# Patient Record
(demographics unavailable — no encounter records)

---

## 2025-07-01 NOTE — DISCUSSION/SUMMARY
[FreeTextEntry1] : REASON FOR CONSULT Heather Dowd is a 62-year-old female who was referred by Dr. Ashely Carson for cancer genetic counseling and risk assessment due to a recent breast cancer diagnosis. She was accompanied by two of her daughters.  RELEVANT MEDICAL HISTORY Ms. Dowd was recently diagnosed with right breast cancer in 2025 at 62 years old. Pathology report revealed multifocal infiltrating ductal carcinoma and ductal carcinoma in situ, biomarkers are pending. Upcoming imaging and genetic testing will help to determine treatment plans.   Of note, Ms. Dowd reports undergoing a pancreas biopsy on 2025 due to a history of left-sided pain starting approximately 1 year ago. She stated that results are pending. She reports that her recent colonoscopy last year also revealed results related to her pancreas. Report was not available for review at today's appointment.   OTHER MEDICAL AND SURGICAL HISTORY:  -	 Medical History: hypertension, hyperlipidemia -	Surgical History:  section x3; Patient reports undergoing breast surgery approximately 15 years for a benign breast and milk duct lesion.    PAST OB/GYN HISTORY:  Obstetrical History:  Age at Menarche: 12 Menopausal with LMP at age 55   Age at First Live Birth: 35 Oral Contraceptive Use: No Hormone Replacement Therapy: No   CANCER SCREENING HISTORY:    Breast:  -	Mammography: 2025-Bilateral, an asymmetry was identified in the right breast. Additional imaging was recommended. 2025-Right, a focal asymmetry was identified. Ultrasound-guided biopsies were recommended.  -	Sonography: 2025-Right, an irregular hypoechoic mass corresponding to the mammographic finding was identified as well as an irregular hypoechoic mass. Ultrasound-guided biopsies were recommended.  -	MRI: To be scheduled.  -	Biopsies: 2025-Right, IDC, DCIS GYN: -	Pelvic Examination: last , reportedly normal  -	Sonography: last 3-4 years ago for a routine work-up, reportedly normal  -	CA-125: No Colon: -	Colonoscopy: last , patient reported two colorectal polyps were identified as well as a result related to her pancreas. Next colonoscopy was recommended in 4 years. This was the patient's first and only colonoscopy. -	Upper Endoscopy: last 2025, patient reports that she underwent a pancreatic biopsy and results are pending.  Skin:   -	FBSE: last , reportedly normal  -	Lesions biopsied/removed: No   SOCIAL HISTORY: -	Tobacco-product use: No  FAMILY HISTORY: Maternal ancestry was reported as [place] and paternal ancestry was reported as [place] and [place]. Ashkenazi Gnosticism ancestry and consanguinity were denied. A detailed family history of cancer was ascertained. Relevant diagnoses are detailed below and in the scanned pedigree.   To Ms. Dowd's knowledge, no one in the family has had germline testing for cancer susceptibility.   	 	RISK ASSESSMENT: Ms. Dowd's personal history of breast cancer and/or family history of breast cancer is suggestive of an inherited predisposition to breast cancer and related cancers. Given that she plans to make surgical decisions based on results from genetic testing, we recommended the [facility] BRCAplus STAT Panel testing for genes associated with breast cancer (typically results within 5-12 days). This test analyzes 13 genes: ALLI, BARD1, BRCA1, BRCA2, CDH1, CHEK2, NF1, PALB2, PTEN, RAD51C, RAD51D, STK11, and TP53.  Concurrent reflex genetic testing for genes associated with breast and gynecological cancer was ordered. This test analyzes 19 genes: ALLI, BARD1, BRCA1, BRCA2, BRIP1, CDH1, CHEK2, EPCAM, MLH1, MSH2, MSH6, NF1, PALB2, PMS2, PTEN, RAD51C, RAD51D, STK11, and TP53.  We discussed the risks, benefits and limitations, and implications of genetic testing. We also discussed the psychosocial implications of genetic testing. Possible test results were reviewed with Ms. Dowd, along with associated medical management options.   Ms. Dowd consented to the above-mentioned genetic testing panel. Blood was drawn in our laboratory and sent to [facility] today.  PLAN:  1.	Blood drawn today will be sent to [facility] for analysis.  2.	We will contact Ms. Dowd once the results are available and will schedule a follow-up appointment, as needed. STAT results generally return in 10-12 days from the day the sample is received in the lab.  For any additional questions please call [facility] at [phone].    Krissy Stevenson MS, [facility] Counselor, [facility]   CC:  Dr. Ashely Carson

## 2025-07-01 NOTE — HISTORY OF PRESENT ILLNESS
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 The patient underwent screening mammogram on 6/13/25. Prior to this, her last breast imaging was completed about 3 years ago as the patient was living in [facility]. The mammogram identified an area of asymmetry in the right breast at 6:00. Additional diagnostic views were obtained which identified 2 suspicious masses in the right breast. One at 7:00 7cmFN (measuring 1.5 cm) and 8:00 7cmFN (measuring 0.5 cm), for which biopsy was recommended. Right breast 7:00 biopsy yielded invasive ductal carcinoma (receptors pending); the mass at 8:00 yielded DCIS, with notation that the 2 clips are 2 cm apart on mammogram.  The patient denies any palpable abnormalities of the breast, no skin changes/dimpling, no nipple discharge bilaterally. The patient has a family history of breast cancer in her mother diagnosed in her 50's. No genetic testing has been completed. Due to the family history and new diagnosis, I recommended genetic counseling and testing be done today. The patient is amenable. I discussed the diagnosis with the patient and her daughters at length. I recommended bilateral breast MRI to further evaluate the extent of disease, as well as the contralateral breast and bilateral axilla. We discussed surgical options of breast conservation vs. mastectomy. The patient is unsure at this time, but she did express interest in reconstructive options for both procedures.   Of note, the patient had experienced intestinal pain and underwent imaging of the abdomen, 3 pancreatic cysts were seen, the patient is s/p pancreas biopsy on 6/16/25 at [facility], pathology is pending.

## 2025-07-01 NOTE — DATA REVIEWED
[FreeTextEntry1] : 6/13/25 (R) b/l screening mammo: scattered areas of fibroglandular density. Right 6:00 position area of asymmetry. Spot compression views and targeted ultrasound is recommended. BI-RADS Category 0  6/23/25 (R) right diagnostic mammo and right US: scattered fbg density. 1. Ultrasound-guided core biopsy is recommended for right 7:00 7 cm nipple axis suspicious mass that corresponds to a mammographic finding. 2. Ultrasound-guided core biopsy is recommended for a right 8:00 7 cm nipple axis mass that corresponds to a mammographic finding. 3. Post biopsy mammogram is recommended to assess clip location in relation to mammographic areas of concern. FOLLOW-UP: Biopsy should be considered. BI-RADS 2  6/26/25 (R) right US core biopsy: SITE 1: RIGHT BREAST 7:00-N7, 1.1 CM IRREGULAR SHAPED HYPOECHOIC MASS, WING CLIP: Infiltrating moderately differentiated duct carcinoma. Receptors are pending. Ductal carcinoma in situ (DCIS), intermediate grade, solid type. Intraductal papilloma. SITE 2: RIGHT BREAST 8:00-N8, 5 MM HYPOECHOIC MASS, ANTERIOR TO THE ABOVE DOMINANT MASS, RIBBON CLIP: Ductal carcinoma in situ, intermediate grade, solid type. Reactive changes mostly likely due to cyst rupture. Pathology results indicate that the specimens are malignant. The two clips are 2 cm apart on mammography. The pathology results are concordant with the imaging. Surgical consultation is recommended.

## 2025-07-01 NOTE — PAST MEDICAL HISTORY
[Menarche Age ____] : age at menarche was [unfilled] [Menopause Age____] : age at menopause was [unfilled] [Definite ___ (Date)] : the last menstrual period was [unfilled] [Total Preg ___] : G[unfilled] [Live Births ___] : P[unfilled]  [Age At Live Birth ___] : Age at live birth: [unfilled] [History of Hormone Replacement Treatment] : has no history of hormone replacement treatment [FreeTextEntry5] :  [FreeTextEntry6] : no [FreeTextEntry7] : no [FreeTextEntry8] : yes

## 2025-07-01 NOTE — PLAN
[TextEntry] : bilateral breast MRI follow-up results of genetic testing  Patient to follow up after imaging completed for surgical planning.

## 2025-07-03 NOTE — HISTORY OF PRESENT ILLNESS
[FreeTextEntry1] : 61 y/o female with newly diagnosed right breast invasive ductal carcinoma referred by Dr. Carson presents for initial consultation regarding breast reconstruction options. She is interested in having bilateral mastectomy. She had right breast biopsy on 06/26/2025 and she is schedule for bilateral breast MRI on 07/08/2025. She does not know if she will need radiation or chemotherapy, waiting for MRI results. She had genetic testing done, results pending. Mother was diagnosed with breast cancer. No family history of ovarian cancer. 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  No history of bleeding or clotting disorder.  PE bilateral macromastia with grade II ptosis sn-n 27 28 BD 15 abdomen with c-sections scars and open appy scar in RLQ no hernias  A/P I reviewed with Ms Dowd in detail the risks, benefits, and alternatives of both implant based breast reconstruction as well as autologous tissue reconstruction.   Specifically, I explained to her than an implant reconstruction usually consists of two separate stages with two surgeries spaced about 4 months apart. At the time of the mastectomy, a tissue expander is placed underneath the pectoralis muscle or on top of the pectoralis muscle and is often reinforced with biologic mesh - acellular dermal matrix. We expand the tissue expander secondarily in the office by injecting saline into the implant percutaneously until the desired size breast mound is achieved. At that point, a second stage operation is scheduled where we take her back to the operating room and remove the tissue expander and place a permanent breast implant. The permanent prosthesis can be either silicone or saline. The first stage of the reconstruction is approximately 3 hours for one breast and 4-5 hours for a bilateral procedure, with a 1-2 night hospital stay and a four-week recovery. The second stage surgery is an outpatient procedure with a two-week recovery. I reviewed with her the risks of implant reconstruction including, but not limited to, bleeding, scarring, implant infection, implant rupture, capsule contracture, implant malposition, asymmetry, contour abnormality, and need for revision surgeries. I also discussed the FDA recommendations for silicone implant rupture screening with MRI.   I then reviewed with Tamera the details of autologous tissue reconstruction, specifically using a free flap from her lower abdomen. This operation entails transplanting the skin, the fat, and blood vessels from the lower abdomen up to the chest wall where we reattach the artery and vein to blood vessels on the chest wall behind the rib to re-vascularize the tissue called a "flap" utilizing microsurgical techniques. We attempt to save all of the muscle fibers of the abdominal rectus muscle to minimize the chance of an abdominal wall weakness, bulge or hernia and only transfer the perforating blood vessels. This is called a VIN flap. I explained to her the scar burden associated with this operation including the scars on the breasts and the lower abdomen and around the umbilicus. I explained to her that there is a risk of free flap loss and vessel thrombosis requiring a return to the operating room for emergent exploration in an attempt to salvage the flap. If we do lose a flap due to vascular compromise, we would likely place a tissue expander at the time of her return to the operating room in order to preserve the breast skin envelope and perform a delayed reconstruction. I reviewed the risks of abdominal wall morbidity including, but not limited to, abdominal wall weakness, hernia or bulge.   The VIN flap is designed to minimize the risk of abdominal wall morbidity as opposed to a TRAM flap that cuts the abdominal wall muscle, but even a VIN flap has a small chance of abdominal wall bulge, weakness or hernia. This operation is approximately 6 hours for one side and 9 hours for a bilateral procedure with a three day hospitalization and six week recovery period. I also explained that there is a possibility of contour abnormality, asymmetry between the two breasts, and possible need for revision surgery.   At this point she is quite certain she wasnts bilateral VIN flaps  Will obtain CTA to confirm she is a candidate and proceed from there.

## 2025-07-08 NOTE — DISCUSSION/SUMMARY
[FreeTextEntry1] : RESULTS TRANSMISSION Heather Dowd is a 62-year-old female who was called on 07/07/2025 for a discussion regarding her genetic testing results related to hereditary cancer predisposition. Her daughters were also present on the call.  Ms. Dowd was originally seen at [facility] on 06/30/2025 for hereditary cancer predisposition risk assessment due to a recent diagnosis of breast cancer. Ms. Dowd decided to pursue genetic testing using the STAT BRCAplus panel with concurrent reflex to the BRCANext panel offered by [facility].  INTERVAL MEDICAL HISTORY: Ms. Dowd reports that she received the results of her recent pancreas biopsy and reports that results were benign. She reports that she plans to be followed with annual MRIs.   TEST RESULTS: NEGATIVE  No pathogenic (disease-causing) variants or VUSs were detected in the following genes:  ALLI, BARD1, BRCA1, BRCA2, BRIP1, CDH1, CHEK2, EPCAM, MLH1, MSH2, MSH6, NF1, PALB2, PMS2, PTEN, RAD51C, RAD51D, STK11, and TP53.  RESULTS INTERPRETATION AND ASSESSMENT: Given Ms. Dowd's personal and current reported family history of cancer, and her negative genetic test results, the following screening guidelines and risk-reducing recommendations were discussed:  BREAST:  - Long-term management and surveillance should be based on Ms. Dowd's on- or post-treatment protocol as recommended by her breast care team.  OTHER:  - In the absence of other indications, Ms. Dowd should practice age-appropriate cancer screening of other organ systems as recommended for the general population.  We also discussed that, while no cause of the patient's personal and family history of cancer was identified, this result, while reassuring, does entirely not rule out a hereditary cancer risk in the patient. It is possible, although unlikely, the patient has a mutation in one of the genes tested that is not detectable by this analysis, or has a mutation in a different gene, either known or unknown. It is also possible there is a hereditary cancer predisposition in the family, but the patient did not inherit it.  We informed Ms. Dowd that our knowledge of genetics and inherited cancer conditions is changing rapidly. Therefore, we recommended that Ms. Dowd contact our office, every 2 to 3 years, to discuss relevant advances in cancer genetics.  We emphasized the importance of re-contacting us with updates regarding her personal and family history of cancer as well as any updates regarding additional cancer genetic test results performed for the patient and/or family members.  Such updates could possibly change our risk assessment and recommendations.   In addition, we discussed Ms. Dowd's siblings could consider pursuing cancer risk assessment genetic counseling with the option of genetic testing for the family history of breast cancer in her mother.  PLAN: 1.	See above for recommended screening and risk-reduction strategies. 2.	Patient informed consult note(s) will be available through their [facility] patient portal and genetic test results will be released via [facility] laboratory portal.  3.	Ms. Dowd was encouraged to contact us every 2-3 years to discuss relevant advances in cancer genetics, or sooner if there are any changes in her personal or family history of cancer.   For any additional questions please call Cancer [facility] at [phone].    Krissy Stevenson MS, [facility] Genetic Counselor, Cancer Genetics   CC:  Patient Dr. Ashely Carson

## 2025-07-14 NOTE — DATA REVIEWED
[FreeTextEntry1] : 6/13/25 (R) b/l screening mammo: scattered areas of fibroglandular density. Right 6:00 position area of asymmetry. Spot compression views and targeted ultrasound is recommended. BI-RADS Category 0  6/23/25 (R) right diagnostic mammo and right US: scattered fbg density. 1. Ultrasound-guided core biopsy is recommended for right 7:00 7 cm nipple axis suspicious mass that corresponds to a mammographic finding. 2. Ultrasound-guided core biopsy is recommended for a right 8:00 7 cm nipple axis mass that corresponds to a mammographic finding. 3. Post biopsy mammogram is recommended to assess clip location in relation to mammographic areas of concern. FOLLOW-UP: Biopsy should be considered. BI-RADS 2  6/26/25 ([facility]) right US core biopsy: SITE 1: RIGHT BREAST 7:00-N7, 1.1 CM IRREGULAR SHAPED HYPOECHOIC MASS, WING CLIP: Infiltrating moderately differentiated duct carcinoma. Receptors are pending. Ductal carcinoma in situ (DCIS), intermediate grade, solid type. Intraductal papilloma. SITE 2: RIGHT BREAST 8:00-N8, 5 MM HYPOECHOIC MASS, ANTERIOR TO THE ABOVE DOMINANT MASS, RIBBON CLIP: Ductal carcinoma in situ, intermediate grade, solid type. Reactive changes mostly likely due to cyst rupture. Pathology results indicate that the specimens are malignant. The two clips are 2 cm apart on mammography. The pathology results are concordant with the imaging. Surgical consultation is recommended.  7/8/25 (R) b/l breast MRI: 1. MRI guided core biopsy is recommended of a 6 mm enhancing mass at the right breast 11 o'clock axis which is 6 cm anterior to the biopsy-proven invasive ductal carcinoma. 2. The biopsy-proven invasive ductal carcinoma measures 1.1 cm. The biopsy-proven DCIS measures 6 mm. 3. Six-month follow-up bilateral breast MRI is recommended for probably benign bilateral foci of enhancement. FOLLOW-UP: MR guided biopsy. ASSESSMENT: BI-RADS Category 4: Suspicious.

## 2025-07-14 NOTE — ASSESSMENT
[FreeTextEntry1] : 61 yo female with multifocal right breast IDC, ER positive, MD positive, HER2 negative

## 2025-07-14 NOTE — PLAN
[TextEntry] : Patient to go to the OR on 7/29/2025 for bilateral mastectomy, right sentinel lymph node biopsy, left injection of mag trace and possible nerve exploration Cardiac clearance needed Medical clearance needed Consent signed and scanned to chart Patient to follow-up 7 to 10 days after surgery completed

## 2025-07-22 NOTE — REASON FOR VISIT
[Home] : at home, [unfilled] , at the time of the educational consult. [Other Location: e.g. Home (Enter Location, City,State)___] : at [unfilled] [Telehealth (audio & video)] : This visit was provided via telehealth using real-time 2-way audio visual technology. [Participant] : the participant

## 2025-07-24 NOTE — HISTORY OF PRESENT ILLNESS
[FreeTextEntry1] : 63 y/o female with newly diagnosed right breast invasive ductal carcinoma referred by Dr. Carson presents for follow up regarding breast reconstruction. She had right breast biopsy on 06/26/2025 and she is schedule for bilateral breast MRI on 07/08/2025. She does not know if she will need radiation or chemotherapy, waiting for MRI results. She had genetic testing done, results pending. Mother was diagnosed with breast cancer. No family history of ovarian cancer. 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 She is schedule for bilateral VIN flap after mastectomy on 07/29/2025.  No history of bleeding or clotting disorder.  PE bilateral macromastia with grade II ptosis sn-n 27 28 BD 15 abdomen with c-sections scars and open appy scar in RLQ no hernias  A/P I reviewed with Ms Dowd in detail the risks, benefits, and alternatives of both implant based breast reconstruction as well as autologous tissue reconstruction.   Specifically, I explained to her than an implant reconstruction usually consists of two separate stages with two surgeries spaced about 4 months apart. At the time of the mastectomy, a tissue expander is placed underneath the pectoralis muscle or on top of the pectoralis muscle and is often reinforced with biologic mesh - acellular dermal matrix. We expand the tissue expander secondarily in the office by injecting saline into the implant percutaneously until the desired size breast mound is achieved. At that point, a second stage operation is scheduled where we take her back to the operating room and remove the tissue expander and place a permanent breast implant. The permanent prosthesis can be either silicone or saline. The first stage of the reconstruction is approximately 3 hours for one breast and 4-5 hours for a bilateral procedure, with a 1-2 night hospital stay and a four-week recovery. The second stage surgery is an outpatient procedure with a two-week recovery. I reviewed with her the risks of implant reconstruction including, but not limited to, bleeding, scarring, implant infection, implant rupture, capsule contracture, implant malposition, asymmetry, contour abnormality, and need for revision surgeries. I also discussed the FDA recommendations for silicone implant rupture screening with MRI.   I then reviewed with Tamera the details of autologous tissue reconstruction, specifically using a free flap from her lower abdomen. This operation entails transplanting the skin, the fat, and blood vessels from the lower abdomen up to the chest wall where we reattach the artery and vein to blood vessels on the chest wall behind the rib to re-vascularize the tissue called a "flap" utilizing microsurgical techniques. We attempt to save all of the muscle fibers of the abdominal rectus muscle to minimize the chance of an abdominal wall weakness, bulge or hernia and only transfer the perforating blood vessels. This is called a VIN flap. I explained to her the scar burden associated with this operation including the scars on the breasts and the lower abdomen and around the umbilicus. I explained to her that there is a risk of free flap loss and vessel thrombosis requiring a return to the operating room for emergent exploration in an attempt to salvage the flap. If we do lose a flap due to vascular compromise, we would likely place a tissue expander at the time of her return to the operating room in order to preserve the breast skin envelope and perform a delayed reconstruction. I reviewed the risks of abdominal wall morbidity including, but not limited to, abdominal wall weakness, hernia or bulge.   The VIN flap is designed to minimize the risk of abdominal wall morbidity as opposed to a TRAM flap that cuts the abdominal wall muscle, but even a VIN flap has a small chance of abdominal wall bulge, weakness or hernia. This operation is approximately 6 hours for one side and 9 hours for a bilateral procedure with a three day hospitalization and six week recovery period. I also explained that there is a possibility of contour abnormality, asymmetry between the two breasts, and possible need for revision surgery.   At this point she is quite certain she wasnts bilateral VIN flaps  Will obtain CTA to confirm she is a candidate and proceed from there.